# Patient Record
Sex: FEMALE | Race: WHITE | Employment: PART TIME | URBAN - METROPOLITAN AREA
[De-identification: names, ages, dates, MRNs, and addresses within clinical notes are randomized per-mention and may not be internally consistent; named-entity substitution may affect disease eponyms.]

---

## 2018-06-12 ENCOUNTER — TRANSFERRED RECORDS (OUTPATIENT)
Dept: HEALTH INFORMATION MANAGEMENT | Facility: CLINIC | Age: 43
End: 2018-06-12

## 2018-06-16 ENCOUNTER — TRANSFERRED RECORDS (OUTPATIENT)
Dept: HEALTH INFORMATION MANAGEMENT | Facility: CLINIC | Age: 43
End: 2018-06-16

## 2021-04-04 ENCOUNTER — PRE VISIT (OUTPATIENT)
Dept: NEUROLOGY | Facility: CLINIC | Age: 46
End: 2021-04-04

## 2021-04-04 NOTE — TELEPHONE ENCOUNTER
FUTURE VISIT INFORMATION      FUTURE VISIT INFORMATION:    Date: 4/4/2021    Time: 1030am    Location: [facility]  REFERRAL INFORMATION:    Referring provider:  Self     Referring providers clinic:      Reason for visit/diagnosis  Migraines     RECORDS REQUESTED FROM:       Clinic name Comments Records Status Imaging Status   [facility] MRI Brain-6/16/2018 Requested  Requested                                    4/4/2021-Request for Records and Images faxed to [facility]-MR @ 915am    4/6/2021-Records from [facility] scanned to Chart-MR @ 556am    4/7/2021-Images received from [facility]-Brought to 4. for PACS Upload-MR @ 7am

## 2021-04-07 ENCOUNTER — VIRTUAL VISIT (OUTPATIENT)
Dept: NEUROLOGY | Facility: CLINIC | Age: 46
End: 2021-04-07
Payer: COMMERCIAL

## 2021-04-07 DIAGNOSIS — G43.E09 CHRONIC MIGRAINE WITH AURA: Primary | ICD-10-CM

## 2021-04-07 PROCEDURE — 99204 OFFICE O/P NEW MOD 45 MIN: CPT | Mod: GT | Performed by: PSYCHIATRY & NEUROLOGY

## 2021-04-07 RX ORDER — ZOLMITRIPTAN 2.5 MG/1
2.5 TABLET, FILM COATED ORAL
Qty: 18 TABLET | Refills: 11 | Status: SHIPPED | OUTPATIENT
Start: 2021-04-07

## 2021-04-07 RX ORDER — ZOLMITRIPTAN 2.5 MG/1
2.5 TABLET, FILM COATED ORAL
COMMUNITY
End: 2021-04-07

## 2021-04-07 RX ORDER — TOPIRAMATE 25 MG/1
100 TABLET, FILM COATED ORAL EVERY EVENING
Qty: 120 TABLET | Refills: 3 | Status: SHIPPED | OUTPATIENT
Start: 2021-04-07

## 2021-04-07 RX ORDER — VENLAFAXINE HYDROCHLORIDE 37.5 MG/1
CAPSULE, EXTENDED RELEASE ORAL DAILY
COMMUNITY
Start: 2020-07-02

## 2021-04-07 NOTE — LETTER
Date:Maria De Jesus 10, 2021      Patient was self referred, no letter generated. Do not send.        [facility] Health Information

## 2021-04-07 NOTE — LETTER
4/7/2021       RE: Eula Louie  [address] [facility]  [place] [address]     Dear Colleague,    Thank you for referring your patient, Eula Louie, to the [facility] [place] [facility]. Please see a copy of my visit note below.    Eula is a 46 year old who is being evaluated via a billable video visit.      How would you like to obtain your AVS? MyChart  If the video visit is dropped, the invitation should be resent by: Send to e-mail at: [email or web address]  Will anyone else be joining your video visit? No      Video-Visit Details    Type of service:  Video Visit    Originating Location (pt. Location): Home    Distant Location (provider location):  [facility] [place]     Platform used for Video Visit: [employer]     [facility]   [facility]  Virtual Neurology Consult     uEla Louie MRN# [id number]   YOB: 1975 Age: 46 year old     Requesting physician: self-referred         Assessment and Recommendations:     Eula Louie is a 46 year old female presents for further evaluation of headache.    Her headache presentation is consistent with a 10-year history of migraine with rare visual aura, which worsened recently to become more frequent and now includes more frequent visual aura as well.    Due to the change in headache pattern, I recommend an updated MRI of the brain without contrast to evaluate for any structural causes that may be provoking her symptoms.  If this is unrevealing, then I would recommend moving forward with a symptomatic treatment strategy focusing on more aggressive preventative treatment for chronic migraine.  We also discussed that if she is perimenopausal, this can be a trigger for worsening of migraine disease in some women, particularly those who have menstrual cycles as a prominent trigger.    -For acute treatment of mild headache,  she will continue ibuprofen as needed, not to exceed more than 14 days/month to avoid medication overuse.  -For acute treatment of moderate to severe headache, she will continue zolmitriptan 2.5 mg taken at the onset of headache, with a repeat dose in 2 hours if needed.  This should not exceed more than 9 days/month to avoid medication overuse.  -She would not choose to use an antiemetic at this time, but this could be added in the future if needed.  Additionally, zolmitriptan has previously caused side effects.  As she is been tolerating it better lately, we will can to this.  However, she continues to have side effects with triptans, there are newer CGRP oral antagonists available that could be tried, including Ubrelvy or Nurtec.    Her headache frequency and severity warrant prevention.  I recommended a trial of topiramate starting 25 mg nightly and titrating it by 25 mg each week as needed and tolerated, to a goal dose of 100 mg at night.  Potential side effects were discussed.  I recommend a 6 to 8-week trial the maximum tolerated dose prior to determining effectiveness.  -If topiramate is not tolerated or not effective after an adequate trial, botulinum toxin injections using a chronic migraine protocol every 12 weeks, alternatively a CGRP monoclonal antibody subcutaneous monthly injection will be considered.      I will plan to see her back in 3 months to monitor her progress, or sooner if needed. I spent 54 minutes on patient care and documentation.    Stella Gibson MD  Neurology            Chief Complaint:     Chief Complaint   Patient presents with     Consult     VIDEO VISIT NEW           History is obtained from the patient and medical record.  Patient was seen via a virtual visit in their home due to the Covid 19 global pandemic.      Eula Louie is a 46 year old female who has been living with headaches for the past 10 years, after the birth of her third child. More recently, she has had an increased  frequency of migraine attacks. Previously, they were correlated with her menstrual cycle.     Her pain occurs behind her right eye. It is a severe, heavy, piercing pain. This lasts at least 6 hours, even with treatment. It can last days if untreated. They rate 8/10 on average. She currently has 20 headache days per month, with 10 severe headache days per month.     She has associated nausea, vomited on one occasion. She has photophobia, phonophobia.     She has had visual aura with spots in her vision at times, which is happening more often now than it used to. This occurs about twice a month currently.     She denies a positional component to her pain. She denies unilateral autonomic features, fevers, or other illness.     Triggers include weather changes.     For treatment, Zomig has been helpful, but she has had some some side effects (burning skin, tired, loopy). She will also take Advil for mild headaches. She previously took other triptans, but had side effects and they weren't helpful.     She started venlafaxine 1.5 years ago, which was increased in dose about 6 months ago.     In the past, she took propranolol which made her exhausted at 20 mg daily.             Past Medical History:   PMDD           Past Surgical History:   No surgery          Social History:    and has three children. 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    She denies smoking, drinks alcohol occasionally, and denies drug use. She drinks 1-2 cups of coffee per day.          Family History:   There is a no family history of migraine.          Allergies:      Allergies   Allergen Reactions     Penicillins Rash             Medications:     Current Outpatient Medications:      venlafaxine (EFFEXOR-XR) 37.5 MG 24 hr capsule, daily , Disp: , Rfl:      ZOLMitriptan (ZOMIG) 2.5 MG tablet, Take 2.5 mg by mouth at onset of headache for migraine, Disp: , Rfl:           Physical Exam:    There were no vitals taken for this visit.   Physical Exam:   General: NAD  Neurologic:   Mental Status Exam: Alert, awake and oriented to situation. No dysarthria. Speech of normal fluency.          Data:     MRI brain (2018): Per report, normal            Again, thank you for allowing me to participate in the care of your patient.      Sincerely,    Stella Gibson MD

## 2021-04-07 NOTE — PROGRESS NOTES
Eula is a 46 year old who is being evaluated via a billable video visit.      How would you like to obtain your AVS? MyChart  If the video visit is dropped, the invitation should be resent by: Send to e-mail at: [email or web address]  Will anyone else be joining your video visit? No      Video-Visit Details    Type of service:  Video Visit    Originating Location (pt. Location): Home    Distant Location (provider location):  [facility]     Platform used for Video Visit: [employer]     [facility]  Virtual Neurology Consult     Eula Louie MRN# [id number]   YOB: 1975 Age: 46 year old     Requesting physician: self-referred         Assessment and Recommendations:     Eula Louie is a 46 year old female presents for further evaluation of headache.    Her headache presentation is consistent with a 10-year history of migraine with rare visual aura, which worsened recently to become more frequent and now includes more frequent visual aura as well.    Due to the change in headache pattern, I recommend an updated MRI of the brain without contrast to evaluate for any structural causes that may be provoking her symptoms.  If this is unrevealing, then I would recommend moving forward with a symptomatic treatment strategy focusing on more aggressive preventative treatment for chronic migraine.  We also discussed that if she is perimenopausal, this can be a trigger for worsening of migraine disease in some women, particularly those who have menstrual cycles as a prominent trigger.    -For acute treatment of mild headache, she will continue ibuprofen as needed, not to exceed more than 14 days/month to avoid medication overuse.  -For acute treatment of moderate to severe headache, she will continue zolmitriptan 2.5 mg taken at the onset of headache, with a repeat dose in 2 hours if needed.  This should not exceed more than 9  days/month to avoid medication overuse.  -She would not choose to use an antiemetic at this time, but this could be added in the future if needed.  Additionally, zolmitriptan has previously caused side effects.  As she is been tolerating it better lately, we will can to this.  However, she continues to have side effects with triptans, there are newer CGRP oral antagonists available that could be tried, including Ubrelvy or Nurtec.    Her headache frequency and severity warrant prevention.  I recommended a trial of topiramate starting 25 mg nightly and titrating it by 25 mg each week as needed and tolerated, to a goal dose of 100 mg at night.  Potential side effects were discussed.  I recommend a 6 to 8-week trial the maximum tolerated dose prior to determining effectiveness.  -If topiramate is not tolerated or not effective after an adequate trial, botulinum toxin injections using a chronic migraine protocol every 12 weeks, alternatively a CGRP monoclonal antibody subcutaneous monthly injection will be considered.      I will plan to see her back in 3 months to monitor her progress, or sooner if needed. I spent 54 minutes on patient care and documentation.    Stella Gibson MD  Neurology            Chief Complaint:     Chief Complaint   Patient presents with     Consult     VIDEO VISIT NEW           History is obtained from the patient and medical record.  Patient was seen via a virtual visit in their home due to the Covid 19 global pandemic.      Eula Louie is a 46 year old female who has been living with headaches for the past 10 years, after the birth of her third child. More recently, she has had an increased frequency of migraine attacks. Previously, they were correlated with her menstrual cycle.     Her pain occurs behind her right eye. It is a severe, heavy, piercing pain. This lasts at least 6 hours, even with treatment. It can last days if untreated. They rate 8/10 on average. She currently has 20  headache days per month, with 10 severe headache days per month.     She has associated nausea, vomited on one occasion. She has photophobia, phonophobia.     She has had visual aura with spots in her vision at times, which is happening more often now than it used to. This occurs about twice a month currently.     She denies a positional component to her pain. She denies unilateral autonomic features, fevers, or other illness.     Triggers include weather changes.     For treatment, Zomig has been helpful, but she has had some some side effects (burning skin, tired, loopy). She will also take Advil for mild headaches. She previously took other triptans, but had side effects and they weren't helpful.     She started venlafaxine 1.5 years ago, which was increased in dose about 6 months ago.     In the past, she took propranolol which made her exhausted at 20 mg daily.             Past Medical History:   PMDD           Past Surgical History:   No surgery          Social History:    and has three children. 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    She denies smoking, drinks alcohol occasionally, and denies drug use. She drinks 1-2 cups of coffee per day.          Family History:   There is a no family history of migraine.          Allergies:      Allergies   Allergen Reactions     Penicillins Rash             Medications:     Current Outpatient Medications:      venlafaxine (EFFEXOR-XR) 37.5 MG 24 hr capsule, daily , Disp: , Rfl:      ZOLMitriptan (ZOMIG) 2.5 MG tablet, Take 2.5 mg by mouth at onset of headache for migraine, Disp: , Rfl:           Physical Exam:   There were no vitals taken for this visit.   Physical Exam:   General: NAD  Neurologic:   Mental Status Exam: Alert, awake and oriented to situation. No dysarthria. Speech of normal fluency.          Data:     MRI brain (2018): Per report, normal

## 2021-04-18 ENCOUNTER — HOSPITAL ENCOUNTER (OUTPATIENT)
Dept: MRI IMAGING | Facility: CLINIC | Age: 46
Discharge: HOME | End: 2021-04-18
Attending: PSYCHIATRY & NEUROLOGY | Admitting: PSYCHIATRY & NEUROLOGY
Payer: COMMERCIAL

## 2021-04-18 DIAGNOSIS — G43.E09 CHRONIC MIGRAINE WITH AURA: ICD-10-CM

## 2021-04-18 PROCEDURE — 70551 MRI BRAIN STEM W/O DYE: CPT

## 2021-04-19 ENCOUNTER — TELEPHONE (OUTPATIENT)
Dept: NEUROLOGY | Facility: CLINIC | Age: 46
End: 2021-04-19

## 2021-04-19 NOTE — TELEPHONE ENCOUNTER
[facility] Call Center    Phone Message    May a detailed message be left on voicemail: yes     Reason for Call: Medication Question or concern regarding medication   Prescription Clarification  Name of Medication: venlafaxine (EFFEXOR-XR) 37.5 MG 24 hr capsule  Prescribing Provider: N/A   Pharmacy: [facility] - [place], [place] - [address] [place] [address]   What on the order needs clarification? Eula calling to due to some questions regarding this medication. She stated that she is currently taking 112.5 mg of this medication a day and is wondering if that will affect any of her other medications. Please call Eula at your earliest convenience to discuss.    Action Taken: Message routed to:  [facility] ([facility]    Travel Screening: Not Applicable

## 2021-04-21 NOTE — TELEPHONE ENCOUNTER
"I called pt to let her know Dr. Gibson reviewed her message and \"Venlafaxine and topiramate should not interact. Venlafaxine and zolmitriptan both work on serotonin, and rarely, serotonin levels that are too high can cause fever, stiffness as part of serotonin syndrome. I would not expect this in the doses she takes.\".     Pt understands. She will start her topiramate tonight. She is starting at 25mg daily and will titrate up by 25mg each week to goal dose of 100mg daily.     Mary CZAARES   "

## 2021-05-15 ENCOUNTER — HEALTH MAINTENANCE LETTER (OUTPATIENT)
Age: 46
End: 2021-05-15

## 2021-05-20 ENCOUNTER — TELEPHONE (OUTPATIENT)
Dept: NEUROLOGY | Facility: CLINIC | Age: 46
End: 2021-05-20

## 2021-05-20 ENCOUNTER — MYC MEDICAL ADVICE (OUTPATIENT)
Dept: NEUROLOGY | Facility: CLINIC | Age: 46
End: 2021-05-20

## 2021-05-20 NOTE — TELEPHONE ENCOUNTER
Pt is due for 3 month f/u with Dr. Gibson. Please schedule pt for a video appointment, around 7/7/21

## 2021-05-20 NOTE — TELEPHONE ENCOUNTER
RANDYM. Pt is due for 3 month f/u with Dr. Gibson. Please schedule pt for a video appointment, around 7/7/21

## 2021-09-04 ENCOUNTER — HEALTH MAINTENANCE LETTER (OUTPATIENT)
Age: 46
End: 2021-09-04

## 2022-06-11 ENCOUNTER — HEALTH MAINTENANCE LETTER (OUTPATIENT)
Age: 47
End: 2022-06-11

## 2022-10-16 ENCOUNTER — HEALTH MAINTENANCE LETTER (OUTPATIENT)
Age: 47
End: 2022-10-16

## 2023-06-17 ENCOUNTER — HEALTH MAINTENANCE LETTER (OUTPATIENT)
Age: 48
End: 2023-06-17